# Patient Record
Sex: FEMALE | URBAN - METROPOLITAN AREA
[De-identification: names, ages, dates, MRNs, and addresses within clinical notes are randomized per-mention and may not be internally consistent; named-entity substitution may affect disease eponyms.]

---

## 2019-06-19 ENCOUNTER — APPOINTMENT (OUTPATIENT)
Age: 49
Setting detail: DERMATOLOGY
End: 2019-06-19

## 2019-06-19 DIAGNOSIS — Z41.9 ENCOUNTER FOR PROCEDURE FOR PURPOSES OTHER THAN REMEDYING HEALTH STATE, UNSPECIFIED: ICD-10-CM

## 2019-06-19 PROCEDURE — OTHER BOTOX: OTHER

## 2019-06-19 NOTE — PROCEDURE: BOTOX
Price (Use Numbers Only, No Special Characters Or $): 828 Price (Use Numbers Only, No Special Characters Or $): 738

## 2019-06-19 NOTE — PROCEDURE: BOTOX
Additional Area 4 Location: [facility] Additional Area 4 Location: [facility]

## 2019-06-19 NOTE — PROCEDURE: BOTOX
Consent: Assessment :\\n“Rosibel Aesthetics Scales”\\n-Wrinkles= mild, Lines present at rest= mild-fine, Folds= mild, Volume Loss & Skin Laxity= mild.  \\n-Glogau Wrinkle Scale:  II-III\\n-Treatment areas reviewed with the patient while holding a hand-held mirror.  \\n-The patient has realistic expectations. \\n-Written consent obtained. Patient verbalized understanding. Questions answered. See written consent on file. \\n-Topical anesthesia  L23%T7% was applied for 10 minutes on upper lip;  Lot #[id number], exp 9/12/19 \\n-The skin was prepped with alcohol, skin markings made and injections provided. \\n-The treatment was administered to the area(s) above.\\n-The patient tolerated the procedure well w/o incident.  \\n-Additional product (Botox) may be necessary for optimal correction following treatment and/or maintenance.\\n\\n-Pt has naturally occurring heaviness in L brow compared to R.\\n-Patient instructed to not lie down for 4-6 hours.  Patient instructed not to massage or manipulate treatment areas (avoid facial treatments or Clarisonic Brush) and limit physical activity for 24 hours.\\n-Recommended:  Voluma temples, cheeks, chin and lateral jawline for structure.\\n-Instructed to avoid: NSAIDs, Vitamin E, Fish oil, Ginko Balboa and alcohol 5-7 days before filler treatment Consent: Assessment :\\n“Rosibel Aesthetics Scales”\\n-Wrinkles= mild, Lines present at rest= mild-fine, Folds= mild, Volume Loss & Skin Laxity= mild.  \\n-Glogau Wrinkle Scale:  II-III\\n-Treatment areas reviewed with the patient while holding a hand-held mirror.  \\n-The patient has realistic expectations. \\n-Written consent obtained. Patient verbalized understanding. Questions answered. See written consent on file. \\n-Topical anesthesia  L23%T7% was applied for 10 minutes on upper lip;  Lot #[id number], exp 9/12/19 \\n-The skin was prepped with alcohol, skin markings made and injections provided. \\n-The treatment was administered to the area(s) above.\\n-The patient tolerated the procedure well w/o incident.  \\n-Additional product (Botox) may be necessary for optimal correction following treatment and/or maintenance.\\n\\n-Pt has naturally occurring heaviness in L brow compared to R.\\n-Patient instructed to not lie down for 4-6 hours.  Patient instructed not to massage or manipulate treatment areas (avoid facial treatments or Clarisonic Brush) and limit physical activity for 24 hours.\\n-Recommended:  Voluma temples, cheeks, chin and lateral jawline for structure.\\n-Instructed to avoid: NSAIDs, Vitamin E, Fish oil, Ginko Balboa and alcohol 5-7 days before filler treatment

## 2019-09-26 ENCOUNTER — APPOINTMENT (OUTPATIENT)
Age: 49
Setting detail: DERMATOLOGY
End: 2019-09-26

## 2019-09-26 DIAGNOSIS — Z41.9 ENCOUNTER FOR PROCEDURE FOR PURPOSES OTHER THAN REMEDYING HEALTH STATE, UNSPECIFIED: ICD-10-CM

## 2019-09-26 PROCEDURE — OTHER BOTOX: OTHER

## 2019-09-26 NOTE — PROCEDURE: BOTOX
Additional Area 4 Location: [facility] Additional Area 4 Location: [facility]

## 2019-09-26 NOTE — PROCEDURE: BOTOX
Price (Use Numbers Only, No Special Characters Or $): 945 Price (Use Numbers Only, No Special Characters Or $): 656

## 2019-09-26 NOTE — PROCEDURE: BOTOX
Consent: Assessment :\\n“Rosibel Aesthetics Scales”\\n-Wrinkles= mild, Lines present at rest= mild-fine, Folds= mild, Volume Loss & Skin Laxity= mild.  \\n-Glogau Wrinkle Scale:  II-III\\n-Pt has naturally occurring heaviness in L brow compared to R.\\n\\n-Treatment areas reviewed with the patient while holding a hand-held mirror.  \\n-The patient has realistic expectations. \\n-Written consent obtained. Patient verbalized understanding. Questions answered. See written consent on file. \\n\\n-Topical anesthesia  L23%T7% was applied for 10 minutes on upper lip;  Lot #[id number], exp 9/12/19 \\n-The skin was prepped with alcohol, skin markings made and injections provided. \\n-The treatment was administered to the area(s) noted.\\n-The patient tolerated the procedure well w/o incident.  \\n-Additional product (Botox) may be necessary for optimal correction following treatment and/or maintenance.\\n-Patient instructed to not lie down for 4-6 hours.  Patient instructed not to massage or manipulate treatment areas (avoid facial treatments or Clarisonic Brush) and limit physical activity for 24 hours.\\n\\n-Recommended:  Voluma temples, cheeks, chin and lateral jawline for structure.\\n-Instructed to avoid: NSAIDs, Vitamin E, Fish oil, Ginko Balboa and alcohol 5-7 days before filler treatment\\n-Skin Care: Obagi hydroquinone \\ntretinoin Consent: Assessment :\\n“Rosibel Aesthetics Scales”\\n-Wrinkles= mild, Lines present at rest= mild-fine, Folds= mild, Volume Loss & Skin Laxity= mild.  \\n-Glogau Wrinkle Scale:  II-III\\n-Pt has naturally occurring heaviness in L brow compared to R.\\n\\n-Treatment areas reviewed with the patient while holding a hand-held mirror.  \\n-The patient has realistic expectations. \\n-Written consent obtained. Patient verbalized understanding. Questions answered. See written consent on file. \\n\\n-Topical anesthesia  L23%T7% was applied for 10 minutes on upper lip;  Lot #[id number], exp 9/12/19 \\n-The skin was prepped with alcohol, skin markings made and injections provided. \\n-The treatment was administered to the area(s) noted.\\n-The patient tolerated the procedure well w/o incident.  \\n-Additional product (Botox) may be necessary for optimal correction following treatment and/or maintenance.\\n-Patient instructed to not lie down for 4-6 hours.  Patient instructed not to massage or manipulate treatment areas (avoid facial treatments or Clarisonic Brush) and limit physical activity for 24 hours.\\n\\n-Recommended:  Voluma temples, cheeks, chin and lateral jawline for structure.\\n-Instructed to avoid: NSAIDs, Vitamin E, Fish oil, Ginko Balboa and alcohol 5-7 days before filler treatment\\n-Skin Care: Obagi hydroquinone \\ntretinoin

## 2019-10-15 ENCOUNTER — APPOINTMENT (OUTPATIENT)
Age: 49
Setting detail: DERMATOLOGY
End: 2019-10-15

## 2019-10-15 DIAGNOSIS — Z41.9 ENCOUNTER FOR PROCEDURE FOR PURPOSES OTHER THAN REMEDYING HEALTH STATE, UNSPECIFIED: ICD-10-CM

## 2019-10-15 PROCEDURE — OTHER JUVEDERM VOLUMA XC INJECTION: OTHER

## 2019-10-15 NOTE — PROCEDURE: JUVEDERM VOLUMA XC INJECTION
Price (Use Numbers Only, No Special Characters Or $): 4982 Price (Use Numbers Only, No Special Characters Or $): 9125

## 2019-10-15 NOTE — PROCEDURE: JUVEDERM VOLUMA XC INJECTION
Consent: -Assessment: \\n(“Rosibel Aesthetics Scales”) Wrinkles= mild, Lines Present at Rest= mild-fine, Folds= mild-moderate, Volume Loss & Skin Laxity=moderate. \\nGlogau Wrinkle Scale= I  II III IV\\n-Treatment areas reviewed with patient holding a hand held mirror. The patient has realistic expectations.  \\n-The patient was informed that the tear troughs with the BD syringe micro-droplet technique is jawline and chin filler are considered off label.\\n-Written consent obtained. Questions answered. Patient verbalized understanding of the content.  See written informed consent on file. \\n-The skin was prepped with with alcohol and Puracyn prior to treatment. Skin markings made.\\n-The filler was administered to the treatment areas noted.  \\n-Aseptic technique was maintained throughout treatment with Chlorhexadine  and Puracyn wipes.\\n-The patient tolerated the procedure well w/o incident. \\n-Additional product (hyaluronic ) may be necessary for optimal correction following treatment and/or maintenance.\\n-Ice provided post treatment for 2 to 5 minutes and or to take home. \\n-Patient instructed to apply ice 20 minutes on, 20 minutes off while awake for one to 1 to 2 days to reduce swelling. Avoid massage in the area. An over-the-counter antihistamine may be beneficial and was recommended to help with swelling. Patient instructed to notify clinic if any bruising worsens, travels or becomes painful.\\n-Skin Care:  recommended and sample provided of the Epionce Renewal Lite lotion.\\n-Discussed other treatment recommendations for neck area.  She has done a series of Kybella, I recommended 1 more vial submental to clean any remaining fat that could be causing the some of the minor fullness.  I had also suggested trying 10-15 units medially in playtsmal bands and Thermage every 2-3 years for skin tightening of the neck and jawline following these other recommendations.\\n-Plan 2nd syringe of Voluma along lateral jawline in the near future. \\n -Instructed to avoid NSAIDS, Vitamin E, Fish Oil, Ginkgo Balboa, and alcohol 5-7 days before filler treatment.\\n-20% [employer] discount \\n-2nd syringe of Voluma; used 0.45mL - house goods N/C

## 2019-12-09 ENCOUNTER — APPOINTMENT (OUTPATIENT)
Age: 49
Setting detail: DERMATOLOGY
End: 2019-12-09

## 2019-12-09 DIAGNOSIS — Z41.9 ENCOUNTER FOR PROCEDURE FOR PURPOSES OTHER THAN REMEDYING HEALTH STATE, UNSPECIFIED: ICD-10-CM

## 2019-12-09 PROCEDURE — OTHER RESTYLANE SILK INJECTION: OTHER

## 2019-12-09 PROCEDURE — OTHER JUVEDERM ULTRA INJECTION: OTHER

## 2019-12-09 NOTE — PROCEDURE: RESTYLANE SILK INJECTION
Consent: -Wrinkle assessment:  (“Rosibel Aesthetics Scales”) Wrinkles= mild, Lines Present at Rest= mild, Folds= mild, Volume Loss & Skin Laxity=moderate. More medial fat protrusion on L lower eye compared to R side.\\n-Glogau wrinkle scale assessment :  II\\n-Calipers used\\n-Treatment areas reviewed with patient holding a hand held mirror. The patient has realistic expectations.  \\n-The patient was informed that the treatment area is considered off-label.\\n-Written consent obtained. Questions answered. Patient verbalized understanding of the content.  See written informed consent on file. \\n-Skin markings made and the skin was prepped with with alcohol and chlorhexadine prep. \\n-A 31g BD insulin syringe was back filled and also used to inject micro droplets of Restylane Silk in tear troughs and lateral lid/cheek junction as needed.\\n-Aseptic technique was maintained throughout treatment.\\n-The patient tolerated the procedure well w/o incident. \\n-Additional product (hyaluronic ) may be necessary for optimal correction following treatment and/or maintenance. \\n-Instructed to avoid NSAIDS, Vitamin E, Fish Oil, Ginkgo Biloba, and alcohol 5-7 days before filler treatment.\\n-Ice provided post treatment for 2 to 5 minutes and or to take home. -Patient instructed to apply ice 20 minutes on, 20 minutes off while awake for one to 1 to 2 days to reduce swelling. Avoid massage in the area. An over-the-counter antihistamine may be beneficial and was recommended to help with swelling.  Patient instructed to notify clinic if any bruising worsens, travels or becomes painful.

## 2019-12-09 NOTE — PROCEDURE: JUVEDERM ULTRA INJECTION
Price (Use Numbers Only, No Special Characters Or $): 760 Price (Use Numbers Only, No Special Characters Or $): 939

## 2019-12-09 NOTE — PROCEDURE: JUVEDERM ULTRA INJECTION
Consent: -Assessment \"Rosibel Aesthetics Scales\":\\nWrinkles: mild, Lines present at rest: none to mild, Folds: mild, Volume loss/skin laxity: mild.\\n-Treatment areas reviewed with patient holding a hand held mirror. The patient has realistic expectations.  \\n-Written consent obtained.  Questions answered. Patient verbalized understanding of the content.  See written informed consent on file. \\n-Topical anesthesia was achieved with 23% lidocaine, 7% tetracaine for 30 minutes.\\n-Topical anesthesia(lot#[id number]@3 exp. 12/27/19) was removed. \\n-Listerine mouth rinse provided.\\n-The skin was prepped with with alcohol and chlorhexadine prep. \\n-Aspetic technique maintained throughout procedure.\\n-The filler was administered to the treatment areas noted above. \\n-A 31g BD insulin syringe was back filled with JuveDerm Ultra to deliver micro-droplet technique where necessary.\\n-The patient tolerated the procedure well w/o incident. \\n-Vaseline applied to lips\\n-Additional product (hyaluronic ) may be necessary for optimal correction following treatment and/or maintenance.\\n-Ice provided post treatment for 2 to 5 minutes and or to take home. Patient instructed to apply ice 20 minutes on, 20 minutes off while awake for one to 1 to 2 days to reduce swelling. Avoid massage in the area. An over-the-counter antihistamine may be beneficial for the first 72 hours and was recommended to help with swelling.  Patient instructed to notify clinic if any bruising worsens, travels or becomes painful.\\n\\nRecommended: \\n-Instructed to avoid: NSAIDS, omegas, fish-oil, Ginkgo Biloba and alcohol for 5-7 days before any filler treatment.\\nSkin Care: Consent: -Assessment \"Rosibel Aesthetics Scales\":\\nWrinkles: mild, Lines present at rest: none to mild, Folds: mild, Volume loss/skin laxity: mild.\\n-Treatment areas reviewed with patient holding a hand held mirror. The patient has realistic expectations.  \\n-Written consent obtained.  Questions answered. Patient verbalized understanding of the content.  See written informed consent on file. \\n-Topical anesthesia was achieved with 23% lidocaine, 7% tetracaine for 30 minutes.\\n-Topical anesthesia(lot#[id number]@3 exp. 12/27/19) was removed. \\n-Listerine mouth rinse provided.\\n-The skin was prepped with with alcohol and chlorhexadine prep. \\n-Aspetic technique maintained throughout procedure.\\n-The filler was administered to the treatment areas noted above. \\n-A 31g BD insulin syringe was back filled with JuveDerm Ultra to deliver micro-droplet technique where necessary.\\n-The patient tolerated the procedure well w/o incident. \\n-Vaseline applied to lips\\n-Additional product (hyaluronic ) may be necessary for optimal correction following treatment and/or maintenance.\\n-Ice provided post treatment for 2 to 5 minutes and or to take home. Patient instructed to apply ice 20 minutes on, 20 minutes off while awake for one to 1 to 2 days to reduce swelling. Avoid massage in the area. An over-the-counter antihistamine may be beneficial for the first 72 hours and was recommended to help with swelling.  Patient instructed to notify clinic if any bruising worsens, travels or becomes painful.\\n\\nRecommended: \\n-Instructed to avoid: NSAIDS, omegas, fish-oil, Ginkgo Biloba and alcohol for 5-7 days before any filler treatment.\\nSkin Care:

## 2019-12-09 NOTE — PROCEDURE: RESTYLANE SILK INJECTION
Price (Use Numbers Only, No Special Characters Or $): 359 Price (Use Numbers Only, No Special Characters Or $): 923

## 2020-01-03 ENCOUNTER — APPOINTMENT (OUTPATIENT)
Age: 50
Setting detail: DERMATOLOGY
End: 2020-01-03

## 2020-01-03 DIAGNOSIS — Z41.9 ENCOUNTER FOR PROCEDURE FOR PURPOSES OTHER THAN REMEDYING HEALTH STATE, UNSPECIFIED: ICD-10-CM

## 2020-01-03 PROCEDURE — OTHER BOTOX: OTHER

## 2020-03-10 ENCOUNTER — APPOINTMENT (OUTPATIENT)
Age: 50
Setting detail: DERMATOLOGY
End: 2020-06-16

## 2020-07-28 ENCOUNTER — APPOINTMENT (OUTPATIENT)
Age: 50
Setting detail: DERMATOLOGY
End: 2020-07-28

## 2020-07-28 DIAGNOSIS — Z41.9 ENCOUNTER FOR PROCEDURE FOR PURPOSES OTHER THAN REMEDYING HEALTH STATE, UNSPECIFIED: ICD-10-CM

## 2020-07-28 PROCEDURE — OTHER JUVEDERM ULTRA INJECTION: OTHER

## 2020-07-28 PROCEDURE — OTHER SCULPTRA: OTHER

## 2020-07-28 PROCEDURE — OTHER BOTOX: OTHER

## 2020-07-28 NOTE — PROCEDURE: JUVEDERM ULTRA INJECTION
Consent: -Assessment \"Rosibel Aesthetics Scales\":\\nWrinkles: mild, Lines present at rest: none to mild, Folds: mild, Volume loss/skin laxity: mild.\\n-Treatment areas reviewed with patient holding a hand held mirror. The patient has realistic expectations.  \\n-Written consent obtained.  Questions answered. Patient verbalized understanding of the content.  See written informed consent on file. \\n-Topical anesthesia was achieved with 23% lidocaine, 7% tetracaine for 30 minutes.\\n-Topical anesthesia(lot#[id number]@3 exp. 12/27/19) was removed. \\n-Listerine mouth rinse provided.\\n-The skin was prepped with with alcohol and chlorhexadine prep. \\n-Aspetic technique maintained throughout procedure.\\n-The filler was administered to the treatment areas noted above. \\n-A 31g BD insulin syringe was back filled with JuveDerm Ultra to deliver micro-droplet technique where necessary.\\n-The patient tolerated the procedure well w/o incident. \\n-Vaseline applied to lips\\n-Additional product (hyaluronic ) may be necessary for optimal correction following treatment and/or maintenance.\\n-Ice provided post treatment for 2 to 5 minutes and or to take home. Patient instructed to apply ice 20 minutes on, 20 minutes off while awake for one to 1 to 2 days to reduce swelling. Avoid massage in the area. An over-the-counter antihistamine may be beneficial for the first 72 hours and was recommended to help with swelling.  Patient instructed to notify clinic if any bruising worsens, travels or becomes painful.\\n\\nRecommended: \\n-Instructed to avoid: NSAIDS, omegas, fish-oil, Ginkgo Biloba and alcohol for 5-7 days before any filler treatment.\\nSkin Care: Consent: -Assessment \"Rosibel Aesthetics Scales\":\\nWrinkles: mild, Lines present at rest: none to mild, Folds: mild, Volume loss/skin laxity: mild.\\n-Treatment areas reviewed with patient holding a hand held mirror. The patient has realistic expectations.  \\n-Written consent obtained.  Questions answered. Patient verbalized understanding of the content.  See written informed consent on file. \\n-Topical anesthesia was achieved with 23% lidocaine, 7% tetracaine for 30 minutes.\\n-Topical anesthesia(lot#[id number]@3 exp. 12/27/19) was removed. \\n-Listerine mouth rinse provided.\\n-The skin was prepped with with alcohol and chlorhexadine prep. \\n-Aspetic technique maintained throughout procedure.\\n-The filler was administered to the treatment areas noted above. \\n-A 31g BD insulin syringe was back filled with JuveDerm Ultra to deliver micro-droplet technique where necessary.\\n-The patient tolerated the procedure well w/o incident. \\n-Vaseline applied to lips\\n-Additional product (hyaluronic ) may be necessary for optimal correction following treatment and/or maintenance.\\n-Ice provided post treatment for 2 to 5 minutes and or to take home. Patient instructed to apply ice 20 minutes on, 20 minutes off while awake for one to 1 to 2 days to reduce swelling. Avoid massage in the area. An over-the-counter antihistamine may be beneficial for the first 72 hours and was recommended to help with swelling.  Patient instructed to notify clinic if any bruising worsens, travels or becomes painful.\\n\\nRecommended: \\n-Instructed to avoid: NSAIDS, omegas, fish-oil, Ginkgo Biloba and alcohol for 5-7 days before any filler treatment.\\nSkin Care:

## 2020-07-28 NOTE — PROCEDURE: BOTOX
Consent: Assessment :\\n“Rosibel Aesthetics Scales”\\n-Wrinkles= mild, Lines present at rest= mild-fine, Folds= mild, Volume Loss & Skin Laxity= mild.  \\n-Glogau Wrinkle Scale:  II-III\\n-Pt has naturally occurring heaviness in L brow compared to R.\\n\\n-Treatment areas reviewed with the patient while holding a hand-held mirror.  \\n-The patient has realistic expectations. \\n\\n-Informed patient that lower face Botox injections are considered off-label.\\n\\n-Written consent obtained. Patient verbalized understanding. Questions answered. See written consent on file. \\n\\n-Topical anesthesia  L23%T7% was applied for 10 minutes on upper lip;  Lot #[id number], exp 9/12/19 \\n-The skin was prepped with alcohol, skin markings made and injections provided. \\n-The treatment was administered to the area(s) noted.\\n-The patient tolerated the procedure well w/o incident.  \\n-Additional product (Botox) may be necessary for optimal correction following treatment and/or maintenance.\\n-Patient instructed to not lie down for 4-6 hours.  Patient instructed not to massage or manipulate treatment areas (avoid facial treatments or Clarisonic Brush) and limit physical activity for 24 hours.\\n\\n-Calipers used\\n\\n-Recommended:  Voluma temples, cheeks, chin and lateral jawline for structure.\\n-Instructed to avoid: NSAIDs, Vitamin E, Fish oil, Ginkgo Biloba  and alcohol 5-7 days before filler treatment\\n-Skin Care: Obagi hydroquinone, tretinoin Consent: Assessment :\\n“Rosibel Aesthetics Scales”\\n-Wrinkles= mild, Lines present at rest= mild-fine, Folds= mild, Volume Loss & Skin Laxity= mild.  \\n-Glogau Wrinkle Scale:  II-III\\n-Pt has naturally occurring heaviness in L brow compared to R.\\n\\n-Treatment areas reviewed with the patient while holding a hand-held mirror.  \\n-The patient has realistic expectations. \\n\\n-Informed patient that lower face Botox injections are considered off-label.\\n\\n-Written consent obtained. Patient verbalized understanding. Questions answered. See written consent on file. \\n\\n-Topical anesthesia  L23%T7% was applied for 10 minutes on upper lip;  Lot #[id number], exp 9/12/19 \\n-The skin was prepped with alcohol, skin markings made and injections provided. \\n-The treatment was administered to the area(s) noted.\\n-The patient tolerated the procedure well w/o incident.  \\n-Additional product (Botox) may be necessary for optimal correction following treatment and/or maintenance.\\n-Patient instructed to not lie down for 4-6 hours.  Patient instructed not to massage or manipulate treatment areas (avoid facial treatments or Clarisonic Brush) and limit physical activity for 24 hours.\\n\\n-Calipers used\\n\\n-Recommended:  Voluma temples, cheeks, chin and lateral jawline for structure.\\n-Instructed to avoid: NSAIDs, Vitamin E, Fish oil, Ginkgo Biloba  and alcohol 5-7 days before filler treatment\\n-Skin Care: Obagi hydroquinone, tretinoin

## 2020-07-28 NOTE — PROCEDURE: SCULPTRA
Consent: -The Sculptra was diluted with 8.0 mL of sterile water for a total volume of 10.0 mL for each vial. \\n\\n-Assessment: \\n(“Rosibel Aesthetics Scales”) Wrinkes= mild, Lines Present at Rest= mild-fine, Folds= mild, Volume Loss & Skin Laxity=mild. \\n-Treatment areas reviewed with patient holding a hand held mirror. The patient has realistic expectations.  \\n-Written consent obtained. Questions answered.  Patient verbalized understanding of the content.  See consent on file. \\n-The skin was cleansed with alcohol and chlorhexadine prep. The skin was marked as needed. \\n-The Sculptra was injected on periosteum, in adipose layer, and subdermal plane.  \\n-Continuous cleansing with prepping solution performed throughout treatment.  \\n-Aseptic technique was maintained throughout treatment.\\n-A 5 minute massage was provided post treatment while discussing verbal post care instructions.\\n-Patient instructed to apply ice to reduce swelling. Instructed to massage 5x day for 5 minutes x 5 days with moisturizer to distribute product evenly. \\n-The patient tolerated the procedure well w/o incident. \\n-Additional product (Sculptra) may be necessary for optimal correction following treatment and/or maintenance.\\n-Plan JuveDerm Voluma in chin later in 2019. \\n-The patient plans to do a Thermage in the next few months, and a second vial of Sculptra in 6 weeks. \\n-We also discussed Botox in the neck 20-30 units. The patient will do at next Sculptra appointment.\\n-Instructed to avoid NSAIDS, Vitamin E, Fish Oil, Ginkgo Balboa, and alcohol 5-7 days before filler treatment.

## 2020-07-28 NOTE — PROCEDURE: SCULPTRA
Price (Use Numbers Only, No Special Characters Or $): 101 Price (Use Numbers Only, No Special Characters Or $): 972

## 2023-07-26 NOTE — PROCEDURE: BOTOX
Upon review of chart, patient scheduled to see GI on 8/7 Consent: Assessment :\\n“[facility] Scales”\\n-Wrinkles= mild, Lines present at rest= mild-fine, Folds= mild, Volume Loss & Skin Laxity= mild.  \\n-Glogau Wrinkle Scale:  II-III\\n-Pt has naturally occurring heaviness in L brow compared to R.\\n\\n-Treatment areas reviewed with the patient while holding a hand-held mirror.  \\n-The patient has realistic expectations. \\n\\n-Informed patient that lower face Botox injections are considered off-label.\\n\\n-Written consent obtained. Patient verbalized understanding. Questions answered. See written consent on file. \\n\\n-Topical anesthesia  L23%T7% was applied for 10 minutes on upper lip;  Lot #[id number], exp 9/12/19 \\n-The skin was prepped with alcohol, skin markings made and injections provided. \\n-The treatment was administered to the area(s) noted.\\n-The patient tolerated the procedure well w/o incident.  \\n-Additional product (Botox) may be necessary for optimal correction following treatment and/or maintenance.\\n-Patient instructed to not lie down for 4-6 hours.  Patient instructed not to massage or manipulate treatment areas (avoid facial treatments or Clarisonic Brush) and limit physical activity for 24 hours.\\n\\n-Calipers used\\n\\n-Recommended:  Voluma temples, cheeks, chin and lateral jawline for structure.\\n-Instructed to avoid: NSAIDs, Vitamin E, Fish oil, Ginkgo Biloba  and alcohol 5-7 days before filler treatment\\n-Skin Care: Obagi hydroquinone, tretinoin Consent: Assessment :\\n“[facility] Scales”\\n-Wrinkles= mild, Lines present at rest= mild-fine, Folds= mild, Volume Loss & Skin Laxity= mild.  \\n-Glogau Wrinkle Scale:  II-III\\n-Pt has naturally occurring heaviness in L brow compared to R.\\n\\n-Treatment areas reviewed with the patient while holding a hand-held mirror.  \\n-The patient has realistic expectations. \\n\\n-Informed patient that lower face Botox injections are considered off-label.\\n\\n-Written consent obtained. Patient verbalized understanding. Questions answered. See written consent on file. \\n\\n-Topical anesthesia  L23%T7% was applied for 10 minutes on upper lip;  Lot #[id number], exp 9/12/19 \\n-The skin was prepped with alcohol, skin markings made and injections provided. \\n-The treatment was administered to the area(s) noted.\\n-The patient tolerated the procedure well w/o incident.  \\n-Additional product (Botox) may be necessary for optimal correction following treatment and/or maintenance.\\n-Patient instructed to not lie down for 4-6 hours.  Patient instructed not to massage or manipulate treatment areas (avoid facial treatments or Clarisonic Brush) and limit physical activity for 24 hours.\\n\\n-Calipers used\\n\\n-Recommended:  Voluma temples, cheeks, chin and lateral jawline for structure.\\n-Instructed to avoid: NSAIDs, Vitamin E, Fish oil, Ginkgo Biloba  and alcohol 5-7 days before filler treatment\\n-Skin Care: Obagi hydroquinone, tretinoin

## 2025-07-23 NOTE — PROCEDURE: BOTOX
Date: 07/23/25  Physician: Dr. Joe Vasquez  Number of No Shows: 2    NP 2nd no show, Please send info letter to pt.       Dilution (U/0.1 Cc): 10